# Patient Record
Sex: MALE | Race: WHITE | NOT HISPANIC OR LATINO | Employment: FULL TIME | URBAN - METROPOLITAN AREA
[De-identification: names, ages, dates, MRNs, and addresses within clinical notes are randomized per-mention and may not be internally consistent; named-entity substitution may affect disease eponyms.]

---

## 2017-04-04 ENCOUNTER — TELEPHONE (OUTPATIENT)
Dept: FAMILY MEDICINE | Facility: CLINIC | Age: 27
End: 2017-04-04

## 2017-04-04 DIAGNOSIS — F32.4 MAJOR DEPRESSIVE DISORDER WITH SINGLE EPISODE, IN PARTIAL REMISSION (H): ICD-10-CM

## 2017-04-04 NOTE — TELEPHONE ENCOUNTER
citalopram (CELEXA) 10 MG/5ML solution   20 mg, DAILY 1 ordered  Edit     Summary: Take 10 mLs (20 mg) by mouth daily, Disp-900 mL, R-1, E-Prescribe   Dose, Route, Frequency: 20 mg, Oral, DAILY  Start: 8/26/2016  Ord/Sold: 8/26/2016 (O)  Report  Taking:   Long-term:   Pharmacy: [id number] - [place], [address] AT [facility] & [address] [place] [address] Dose History       Patient Sig: Take 10 mLs (20 mg) by mouth daily       Ordered on: 8/26/2016       Authorized by: ANA WATTS       Dispense: 900 mL          Last Office Visit with [facility] primary care provider:  8-        Last PHQ-9 score on record=   PHQ-9 SCORE 8/26/2016   Total Score -   Total Score 4

## 2017-04-06 RX ORDER — CITALOPRAM HYDROBROMIDE 20 MG/10ML
SOLUTION, ORAL ORAL
Qty: 900 ML | Refills: 0 | Status: SHIPPED | OUTPATIENT
Start: 2017-04-06 | End: 2017-07-20

## 2017-04-06 NOTE — TELEPHONE ENCOUNTER
Prescription approved per [facility] Refill Protocol.     Lucila Wheeler RN

## 2017-04-06 NOTE — TELEPHONE ENCOUNTER
Patient is calling to check on status of refill.    Patient is completely out of medication.    Please call patient, [phone], and let them know when this has been sent to pharmacy.    Lotus Pierson

## 2017-07-20 ENCOUNTER — OFFICE VISIT (OUTPATIENT)
Dept: FAMILY MEDICINE | Facility: CLINIC | Age: 27
End: 2017-07-20
Payer: COMMERCIAL

## 2017-07-20 VITALS
SYSTOLIC BLOOD PRESSURE: 118 MMHG | TEMPERATURE: 98.5 F | DIASTOLIC BLOOD PRESSURE: 76 MMHG | BODY MASS INDEX: 25.54 KG/M2 | HEIGHT: 74 IN | WEIGHT: 199 LBS | HEART RATE: 80 BPM

## 2017-07-20 DIAGNOSIS — F32.4 MAJOR DEPRESSIVE DISORDER WITH SINGLE EPISODE, IN PARTIAL REMISSION (H): Primary | Chronic | ICD-10-CM

## 2017-07-20 DIAGNOSIS — R53.83 FATIGUE, UNSPECIFIED TYPE: ICD-10-CM

## 2017-07-20 PROCEDURE — 99213 OFFICE O/P EST LOW 20 MIN: CPT | Performed by: FAMILY MEDICINE

## 2017-07-20 RX ORDER — CITALOPRAM HYDROBROMIDE 20 MG/10ML
SOLUTION, ORAL ORAL
Qty: 900 ML | Refills: 1 | Status: SHIPPED | OUTPATIENT
Start: 2017-07-20 | End: 2018-03-13

## 2017-07-20 ASSESSMENT — ANXIETY QUESTIONNAIRES
5. BEING SO RESTLESS THAT IT IS HARD TO SIT STILL: MORE THAN HALF THE DAYS
2. NOT BEING ABLE TO STOP OR CONTROL WORRYING: NOT AT ALL
3. WORRYING TOO MUCH ABOUT DIFFERENT THINGS: NEARLY EVERY DAY
7. FEELING AFRAID AS IF SOMETHING AWFUL MIGHT HAPPEN: NOT AT ALL
1. FEELING NERVOUS, ANXIOUS, OR ON EDGE: NEARLY EVERY DAY
6. BECOMING EASILY ANNOYED OR IRRITABLE: NEARLY EVERY DAY
GAD7 TOTAL SCORE: 14

## 2017-07-20 ASSESSMENT — PATIENT HEALTH QUESTIONNAIRE - PHQ9: 5. POOR APPETITE OR OVEREATING: NEARLY EVERY DAY

## 2017-07-20 NOTE — PROGRESS NOTES
SUBJECTIVE:                                                    Familia Liz is a 26 year old male who presents to clinic today for the following health issues:    1 year ago I put him on celexa and referred him to be seen at the sleep clinic because he was always tired and was concerned about sleep apnea. The sleep clinic gave him Ambien and told him to go back for a sleep study, but he did not follow up.     Depression Followup    Status since last visit: Stable     See PHQ-9 for current symptoms.  Other associated symptoms: None    Complicating factors:   Significant life event:  No   Current substance abuse:  None  Anxiety or Panic symptoms:  Yes-  Gets anxious every once in a while    Patient has been taking celexa every day for the past year until about the last 3 weeks. He noticed his mood has been different the past couple weeks while being off of celexa, so he is here today for a refill.     PHQ-9  English  PHQ-9   Any Language      Amount of exercise or physical activity: 5 days per week    Problems taking medications regularly: Normally no but hasn't had the medication for 3 weeks because he needs refills.    Medication side effects: none  Diet: regular (no restrictions)      Fatigue During the Day:  Patient states that he falls asleep if he is not stimulated he will fall asleep. He states that sometimes when he is driving and watching TV he feels as if he will fall asleep. Patient also has a referral for a sleep study and would like to know if it is still valid so he can check with his insurance on coverage. He is switching insurance and it was too expensive on his old insurance which is why he decided not to go last year. Patient never filled the Ambien prescription because he hadn't had a sleep study yet.  Does not have the prescription anymore but would like to get another one once he has the sleep study set up.     Problem list and histories reviewed & adjusted, as indicated.  Additional  "history: as documented    Patient Active Problem List   Diagnosis     Depression     Eosinophilic esophagitis     Past Surgical History:   Procedure Laterality Date     NO HISTORY OF SURGERY         Social History   Substance Use Topics     Smoking status: Never Smoker     Smokeless tobacco: Never Used     Alcohol use Yes      Comment: 1-2 drinks a week     Family History   Problem Relation Age of Onset     Coronary Artery Disease No family hx of      CEREBROVASCULAR DISEASE No family hx of          BP Readings from Last 3 Encounters:   07/20/17 118/76   12/23/16 135/89   08/26/16 122/84    Wt Readings from Last 3 Encounters:   07/20/17 199 lb (90.3 kg)   12/23/16 190 lb (86.2 kg)   08/26/16 190 lb (86.2 kg)                        Reviewed and updated as needed this visit by clinical staffTobacco  Allergies  Med Hx  Surg Hx  Fam Hx  Soc Hx      Reviewed and updated as needed this visit by Provider         ROS:  Constitutional, HEENT, cardiovascular, pulmonary, GI, , musculoskeletal, neuro, skin, endocrine and psych systems are negative, except as otherwise noted.    This document serves as a record of the services and decisions personally performed by ANA WATTS. It was created on his/her behalf by Karrie Larson, a trained medical scribe. The creation of this document is based on the provider's statements to the medical scribe. Karrie Larson, July 20, 2017 2:04 PM    OBJECTIVE:   /76 (BP Location: Right arm, Cuff Size: Adult Regular)  Pulse 80  Temp 98.5  F (36.9  C) (Oral)  Ht 6' 2\" (1.88 m)  Wt 199 lb (90.3 kg)  BMI 25.55 kg/m2  Body mass index is 25.55 kg/(m^2).  GENERAL: healthy, alert and no distress  HENT: 3+ tonsils. ear canals and TM's normal, nose and mouth without ulcers or lesions  NECK: no adenopathy, no asymmetry, masses, or scars and thyroid normal to palpation  RESP: lungs clear to auscultation - no rales, rhonchi or wheezes  CV: regular rate and rhythm, normal S1 S2, no S3 or S4, no " murmur, click or rub, no peripheral edema and peripheral pulses strong  PSYCH: mentation appears normal, affect normal/bright    Diagnostic Test Results:  No results found for this or any previous visit (from the past 24 hour(s)).    ASSESSMENT/PLAN:     Depression; recurrent episode-- Mild   Associated with the following complications:    None   Plan:  No changes in the patient's current treatment plan          ICD-10-CM    1. Major depressive disorder with single episode, in partial remission (H) F32.4 citalopram (CELEXA) 10 MG/5ML solution   2. Fatigue, unspecified type R53.83 SLEEP EVALUATION & MANAGEMENT REFERRAL - ADULT     I refilled the patient's celexa and he will fill out the depression questionnaire in 1 month and drop it off at the clinic. I referred him to the sleep clinic. He will follow-up in 6 months for a med check.     Patient Instructions   Fill out the depression questionnaire in 1 month and drop it off at the clinic.    Schedule sleep study.    Follow-up in 6 months.       Dinorah Tillman,   [facility]    The information in this document, created by the medical scribe Karrie Larson for me, accurately reflects the services I personally performed and the decisions made by me. I have reviewed and approved this document for accuracy prior to leaving the patient care area.

## 2017-07-20 NOTE — MR AVS SNAPSHOT
After Visit Summary   7/20/2017    Familia Liz    MRN: [id number]           Patient Information     Date Of Birth          1990        Visit Information        Provider Department      7/20/2017 1:20 PM Dinorah Tillman DO [facility]        Today's Diagnoses     Major depressive disorder with single episode, in partial remission (H)    -  1    Fatigue, unspecified type          Care Instructions    Fill out the depression questionnaire in 1 month and drop it off at the clinic.    Schedule sleep study.    Follow-up in 6 months.           Follow-ups after your visit        Additional Services     SLEEP EVALUATION & MANAGEMENT REFERRAL - ADULT       Please be aware that coverage of these services is subject to the terms and limitations of your health insurance plan.  Call member services at your health plan with any benefit or coverage questions.      Please bring the following to your appointment:    >>   List of current medications   >>   This referral request   >>   Any documents/labs given to you for this referral    [facility] [phone] (Age 15 and up)                  Future tests that were ordered for you today     Open Future Orders        Priority Expected Expires Ordered    SLEEP EVALUATION & MANAGEMENT REFERRAL - ADULT Routine  7/20/2018 7/20/2017            Who to contact     If you have questions or need follow up information about today's clinic visit or your schedule please contact [facility] directly at [phone].  Normal or non-critical lab and imaging results will be communicated to you by MyChart, letter or phone within 4 business days after the clinic has received the results. If you do not hear from us within 7 days, please contact the clinic through MyChart or phone. If you have a critical or abnormal lab result, we will notify you by phone as soon as possible.  Submit refill requests through [employer]  "or call your pharmacy and they will forward the refill request to us. Please allow 3 business days for your refill to be completed.          Additional Information About Your Visit        MyChart Information     [employer] lets you send messages to your doctor, view your test results, renew your prescriptions, schedule appointments and more. To sign up, go to [email or web address] . Click on \"Log in\" on the left side of the screen, which will take you to the Welcome page. Then click on \"Sign up Now\" on the right side of the page.     You will be asked to enter the access code listed below, as well as some personal information. Please follow the directions to create your username and password.     Your access code is: [email or web address]  Expires: 10/18/2017  2:05 PM     Your access code will  in 90 days. If you need help or a new code, please call your [facility] clinic or [phone].        Care EveryWhere ID     This is your [facility] EveryWhere ID. This could be used by other organizations to access your [facility] medical records  [id number]        Your Vitals Were     Pulse Temperature Height BMI (Body Mass Index)          80 98.5  F (36.9  C) (Oral) 6' 2\" (1.88 m) 25.55 kg/m2         Blood Pressure from Last 3 Encounters:   17 118/76   16 135/89   16 122/84    Weight from Last 3 Encounters:   17 199 lb (90.3 kg)   16 190 lb (86.2 kg)   16 190 lb (86.2 kg)                 Today's Medication Changes          These changes are accurate as of: 17  2:05 PM.  If you have any questions, ask your nurse or doctor.               These medicines have changed or have updated prescriptions.        Dose/Directions    citalopram 10 MG/5ML solution   Commonly known as:  celeXA   This may have changed:  See the new instructions.   Used for:  Major depressive disorder with single episode, in partial remission (H)   Changed by:  Dinorah Tillman, DO        TAKE 10 ML BY MOUTH DAILY   Quantity:  900 " mL   Refills:  1            Where to get your medicines      These medications were sent to [id number] - [place], [place] - [address] AT [facility] [address]  [address], [place] [address]     Phone:  [phone]     citalopram 10 MG/5ML solution                Primary Care Provider Office Phone # Fax #    Dinorah Tillman [phone]       [facility] [address] [facility] [address]        Equal Access to Services     LING GREY : Hadii aad ku hadasho Soomaali, waaxda luqadaha, qaybta kaalmada adeegyada, waxay jaunin haymary pacheco . So [place] -[phone].    ATENCIÓN: Si habla español, tiene a steinberg disposición servicios gratuitos de asistencia lingüística. LlLake [facility] [phone].    We comply with applicable federal civil rights laws and [place] laws. We do not discriminate on the basis of race, color, national origin, age, disability sex, sexual orientation or gender identity.            Thank you!     Thank you for choosing [facility]  for your care. Our goal is always to provide you with excellent care. Hearing back from our patients is one way we can continue to improve our services. Please take a few minutes to complete the written survey that you may receive in the mail after your visit with us. Thank you!             Your Updated Medication List - Protect others around you: Learn how to safely use, store and throw away your medicines at www.disposemymeds.org.          This list is accurate as of: 7/20/17  2:05 PM.  Always use your most recent med list.                   Brand Name Dispense Instructions for use Diagnosis    citalopram 10 MG/5ML solution    celeXA    900 mL    TAKE 10 ML BY MOUTH DAILY    Major depressive disorder with single episode, in partial remission (H)

## 2017-07-20 NOTE — NURSING NOTE
"Chief Complaint   Patient presents with     Depression       Initial There were no vitals taken for this visit. Estimated body mass index is 24.39 kg/(m^2) as calculated from the following:    Height as of 12/23/16: 6' 2\" (1.88 m).    Weight as of 12/23/16: 190 lb (86.2 kg).  Medication Reconciliation: complete   Paulette Harrison CMA      "

## 2017-07-20 NOTE — PATIENT INSTRUCTIONS
Fill out the depression questionnaire in 1 month and drop it off at the clinic.    Schedule sleep study.    Follow-up in 6 months.

## 2017-07-21 ASSESSMENT — PATIENT HEALTH QUESTIONNAIRE - PHQ9: SUM OF ALL RESPONSES TO PHQ QUESTIONS 1-9: 19

## 2017-07-21 ASSESSMENT — ANXIETY QUESTIONNAIRES: GAD7 TOTAL SCORE: 14

## 2017-08-03 ENCOUNTER — TELEPHONE (OUTPATIENT)
Dept: SLEEP MEDICINE | Facility: CLINIC | Age: 27
End: 2017-08-03

## 2017-08-03 NOTE — TELEPHONE ENCOUNTER
Hello, this patient has received 3 calls to schedule a sleep consult. At this time, the patient has not called back to schedule. Thank you.

## 2017-11-30 DIAGNOSIS — F32.4 MAJOR DEPRESSIVE DISORDER WITH SINGLE EPISODE, IN PARTIAL REMISSION (H): ICD-10-CM

## 2017-11-30 NOTE — TELEPHONE ENCOUNTER
Medication Detail      Disp Refills Start End ELISA   citalopram (CELEXA) 10 MG/5ML solution 900 mL 1 7/20/2017  No   Sig: TAKE 10 ML BY MOUTH DAILY   Class: E-Prescribe   Order: [id number]   E-Prescribing Status: Receipt confirmed by pharmacy (7/20/2017  2:05 PM CDT)     LOV:7/20/2017

## 2017-12-05 RX ORDER — CITALOPRAM HYDROBROMIDE 20 MG/10ML
SOLUTION, ORAL ORAL
Qty: 900 ML | Refills: 1 | Status: SHIPPED | OUTPATIENT
Start: 2017-12-05 | End: 2020-06-01

## 2017-12-06 NOTE — TELEPHONE ENCOUNTER
Routing refill request to provider for review/approval because:  Last PHQ-9 was 19. Also, unsure how much to refill in liquid form. Patient is due for follow up in 1 month.    Demar Alatorre RN

## 2018-01-11 ENCOUNTER — TELEPHONE (OUTPATIENT)
Dept: FAMILY MEDICINE | Facility: CLINIC | Age: 28
End: 2018-01-11

## 2018-01-11 NOTE — TELEPHONE ENCOUNTER
Please repeat PHQ-9.  Index date: 7/20/17  Follow up start date:  12/20/17  Follow up end date:  2/20/18    Yohana Cortes MA

## 2018-01-11 NOTE — TELEPHONE ENCOUNTER
Left message that we will call back.  Postpone one week and try again  Tanisha Martin,Clinic Rn  [place]

## 2018-03-13 ENCOUNTER — TELEPHONE (OUTPATIENT)
Dept: FAMILY MEDICINE | Facility: CLINIC | Age: 28
End: 2018-03-13

## 2018-03-13 DIAGNOSIS — F32.4 MAJOR DEPRESSIVE DISORDER WITH SINGLE EPISODE, IN PARTIAL REMISSION (H): Chronic | ICD-10-CM

## 2018-03-13 RX ORDER — CITALOPRAM HYDROBROMIDE 20 MG/10ML
SOLUTION, ORAL ORAL
Qty: 300 ML | Refills: 0 | Status: SHIPPED | OUTPATIENT
Start: 2018-03-13 | End: 2018-09-10

## 2018-03-13 NOTE — TELEPHONE ENCOUNTER
Reason for Call:  Medication or medication refill:    Do you use a [facility]?  Name of the pharmacy and phone number for the current request:  [id number] - [place], [place] - [facility] AT [facility] [address]    Name of the medication requested: citalopram (CELEXA) 10 MG/5ML solution    Other request: Patient is going out of town this weekend and is hoping to have the prescription ready before Friday. He has made an appointment 03/26/2018 with Dr Tillman    Can we leave a detailed message on this number? YES    Phone number patient can be reached at: Home number on file [phone] (home)    Best Time: anytime    Call taken on 3/13/2018 at 1:25 PM by Haven Kurtz

## 2018-03-13 NOTE — TELEPHONE ENCOUNTER
Patient notified that med refilled via phone. He has f/u appt scheduled with PCP on 3/26/18.  Ruchi Kitchen RN

## 2018-09-10 ENCOUNTER — TELEPHONE (OUTPATIENT)
Dept: FAMILY MEDICINE | Facility: CLINIC | Age: 28
End: 2018-09-10

## 2018-09-10 DIAGNOSIS — F32.4 MAJOR DEPRESSIVE DISORDER WITH SINGLE EPISODE, IN PARTIAL REMISSION (H): Chronic | ICD-10-CM

## 2018-09-10 NOTE — LETTER
[facility]  [address] [place] [address]  Phone: [phone]        September 26, 2018      Familia Liz                                                                                                             [address] [place] [address]            Hakan Barbosa,        We have attempted to reach you regarding your refill request, but have been unsuccessful.    We have received a refill request for one of your medications. We have sent a refill of your medication to your pharmacy, however your provider has noted that you will need to be seen for a follow up visit in order to continue this medication.    Please contact us at [phone] to schedule an appointment with your provider before your next refill is due.      Thank you,      Your Health Care Team at the [facility]

## 2018-09-10 NOTE — TELEPHONE ENCOUNTER
"Requested Prescriptions   Pending Prescriptions Disp Refills     citalopram (CELEXA) 10 MG/5ML solution [Pharmacy Med Name: CITALOPRAM 10MG/5ML SOLUTION]  Last Written Prescription Date:  3/13/2018  Last Fill Quantity: 300 mL,  # refills: 0   Last office visit: 7/20/2017 with prescribing provider:  ERA Tillman   Future Office Visit:     900 mL 0     Sig: TAKE 10 MLS BY MOUTH DAILY    SSRIs Protocol Failed    9/10/2018  9:06 AM       Failed - PHQ-9 score less than 5 in past 6 months    Please review last PHQ-9 score.   PHQ-9 SCORE 10/1/2015 8/26/2016 7/20/2017   Total Score - - -   Total Score 1 4 19     LEILA-7 SCORE 6/26/2015 8/26/2016 7/20/2017   Total Score 11 - -   Total Score - 2 14          Failed - Recent (6 mo) or future (30 days) visit within the authorizing provider's specialty    Patient had office visit in the last 6 months or has a visit in the next 30 days with authorizing provider or within the authorizing provider's specialty.  See \"Patient Info\" tab in inbasket, or \"Choose Columns\" in Meds & Orders section of the refill encounter.           Passed - Patient is age 18 or older          "

## 2018-09-11 RX ORDER — CITALOPRAM HYDROBROMIDE 20 MG/10ML
SOLUTION, ORAL ORAL
Qty: 300 ML | Refills: 0 | Status: SHIPPED | OUTPATIENT
Start: 2018-09-11 | End: 2018-10-24

## 2018-09-11 NOTE — TELEPHONE ENCOUNTER
Route to PCP. Over a year since last OV and patient no showed last appt and has nothing scheduled. Would you like to give yue? If so, please send back to team for scheduling.    Demar Alatorre RN

## 2018-09-12 NOTE — TELEPHONE ENCOUNTER
Called patient to schedule CPE or med check. Phone number in chart is out of service. Will try again.    Thanks!  Demar Tinoco

## 2018-09-12 NOTE — TELEPHONE ENCOUNTER
Will flag for TC to please contact patient and schedule annual physical w/depression check.    Rafi Ching RN

## 2018-09-19 NOTE — TELEPHONE ENCOUNTER
Attempted to reach patient, number in chart is still out of service, will call back.    Thanks!  Demar Tinoco

## 2018-09-26 NOTE — TELEPHONE ENCOUNTER
Called patient and left a VM message to call and schedule a physical/ med check with PCP.    Lotus Pierson

## 2018-10-24 ENCOUNTER — TELEPHONE (OUTPATIENT)
Dept: FAMILY MEDICINE | Facility: CLINIC | Age: 28
End: 2018-10-24

## 2018-10-24 DIAGNOSIS — F32.4 MAJOR DEPRESSIVE DISORDER WITH SINGLE EPISODE, IN PARTIAL REMISSION (H): ICD-10-CM

## 2018-10-24 RX ORDER — CITALOPRAM HYDROBROMIDE 20 MG/10ML
SOLUTION, ORAL ORAL
Qty: 300 ML | Refills: 0 | Status: SHIPPED | OUTPATIENT
Start: 2018-10-24 | End: 2019-04-23

## 2018-10-24 NOTE — TELEPHONE ENCOUNTER
Reason for Call:  Medication or medication refill:    Do you use a [facility]?  Name of the pharmacy and phone number for the current request:  [facility] [address] [facility], [facility] [phone]    Name of the medication requested: citalopram (CELEXA) 10 MG/5ML solution    Other request:    Patient is scheduled to see Dr Tillman on 11/06/2018 for a refill on his meds but he is wanting to know if Dr Tillman will send a refill to get him through until his appointment.  Please call patient to let him know when this is sent to pharmacy.    Can we leave a detailed message on this number? YES    Phone number patient can be reached at: Home number on file [phone] (home)    Best Time:     Call taken on 10/24/2018 at 12:29 PM by Lotus Pierson

## 2018-10-24 NOTE — TELEPHONE ENCOUNTER
Patient has not been seen since 7/20/17. Appointment scheduled 11/6/18. Can patient have a small amount to hold him over until his appointment?    Routed to provider to advise.  Priscilla Mccoy RN  [facility]

## 2019-02-25 ENCOUNTER — TELEPHONE (OUTPATIENT)
Dept: FAMILY MEDICINE | Facility: CLINIC | Age: 29
End: 2019-02-25

## 2019-02-25 DIAGNOSIS — F32.4 MAJOR DEPRESSIVE DISORDER WITH SINGLE EPISODE, IN PARTIAL REMISSION (H): ICD-10-CM

## 2019-02-25 NOTE — TELEPHONE ENCOUNTER
"Requested Prescriptions   Pending Prescriptions Disp Refills     citalopram (CELEXA) 10 MG/5ML solution [Pharmacy Med Name: CITALOPRAM 10MG/5ML SOLUTION]  Last Written Prescription Date:  10/24/2018  Last Fill Quantity: 300 mL,  # refills: 0   Last office visit: 7/20/2017 with prescribing provider:  ERA Tillman   Future Office Visit:     300 mL 0     Sig: TAKE 10 ML BY MOUTH DAILY    SSRIs Protocol Failed - 2/25/2019  1:28 PM       Failed - PHQ-9 score less than 5 in past 6 months    Please review last PHQ-9 score.   PHQ-9 SCORE 10/1/2015 8/26/2016 7/20/2017   PHQ-9 Total Score - - -   PHQ-9 Total Score 1 4 19     LEILA-7 SCORE 6/26/2015 8/26/2016 7/20/2017   Total Score 11 - -   Total Score - 2 14          Failed - Recent (6 mo) or future (30 days) visit within the authorizing provider's specialty    Patient had office visit in the last 6 months or has a visit in the next 30 days with authorizing provider or within the authorizing provider's specialty.  See \"Patient Info\" tab in inbasket, or \"Choose Columns\" in Meds & Orders section of the refill encounter.           Passed - Medication is active on med list       Passed - Patient is age 18 or older          "

## 2019-02-26 RX ORDER — CITALOPRAM HYDROBROMIDE 20 MG/10ML
SOLUTION, ORAL ORAL
Qty: 300 ML | Refills: 0 | OUTPATIENT
Start: 2019-02-26

## 2019-02-26 NOTE — TELEPHONE ENCOUNTER
Patient has no showed last 2 office visits.  Last office visit: 7/20/2017.  TC:   Please outreach to patient and help scheduled appt.  Thank you.  Almaz Oden RN

## 2019-02-26 NOTE — TELEPHONE ENCOUNTER
Letters and calls have been sent to patient. Refused with note to pharmacy.    Demar Alatorre RN

## 2019-04-23 ENCOUNTER — OFFICE VISIT (OUTPATIENT)
Dept: FAMILY MEDICINE | Facility: CLINIC | Age: 29
End: 2019-04-23
Payer: COMMERCIAL

## 2019-04-23 VITALS
TEMPERATURE: 97.8 F | DIASTOLIC BLOOD PRESSURE: 76 MMHG | BODY MASS INDEX: 25.38 KG/M2 | HEIGHT: 74 IN | WEIGHT: 197.8 LBS | HEART RATE: 76 BPM | SYSTOLIC BLOOD PRESSURE: 108 MMHG

## 2019-04-23 DIAGNOSIS — F32.4 MAJOR DEPRESSIVE DISORDER WITH SINGLE EPISODE, IN PARTIAL REMISSION (H): Primary | ICD-10-CM

## 2019-04-23 DIAGNOSIS — L72.9 CYST OF SKIN: ICD-10-CM

## 2019-04-23 DIAGNOSIS — R53.82 CHRONIC FATIGUE: ICD-10-CM

## 2019-04-23 PROCEDURE — 99214 OFFICE O/P EST MOD 30 MIN: CPT | Performed by: FAMILY MEDICINE

## 2019-04-23 RX ORDER — CITALOPRAM HYDROBROMIDE 20 MG/10ML
SOLUTION, ORAL ORAL
Qty: 300 ML | Refills: 11 | Status: SHIPPED | OUTPATIENT
Start: 2019-04-23 | End: 2020-06-02

## 2019-04-23 ASSESSMENT — PATIENT HEALTH QUESTIONNAIRE - PHQ9
5. POOR APPETITE OR OVEREATING: NOT AT ALL
SUM OF ALL RESPONSES TO PHQ QUESTIONS 1-9: 20

## 2019-04-23 ASSESSMENT — ANXIETY QUESTIONNAIRES
7. FEELING AFRAID AS IF SOMETHING AWFUL MIGHT HAPPEN: SEVERAL DAYS
1. FEELING NERVOUS, ANXIOUS, OR ON EDGE: NEARLY EVERY DAY
3. WORRYING TOO MUCH ABOUT DIFFERENT THINGS: MORE THAN HALF THE DAYS
IF YOU CHECKED OFF ANY PROBLEMS ON THIS QUESTIONNAIRE, HOW DIFFICULT HAVE THESE PROBLEMS MADE IT FOR YOU TO DO YOUR WORK, TAKE CARE OF THINGS AT HOME, OR GET ALONG WITH OTHER PEOPLE: VERY DIFFICULT
6. BECOMING EASILY ANNOYED OR IRRITABLE: NEARLY EVERY DAY
2. NOT BEING ABLE TO STOP OR CONTROL WORRYING: SEVERAL DAYS
GAD7 TOTAL SCORE: 10
5. BEING SO RESTLESS THAT IT IS HARD TO SIT STILL: NOT AT ALL

## 2019-04-23 ASSESSMENT — PAIN SCALES - GENERAL: PAINLEVEL: MILD PAIN (2)

## 2019-04-23 ASSESSMENT — MIFFLIN-ST. JEOR: SCORE: 1941.96

## 2019-04-23 NOTE — PROGRESS NOTES
SUBJECTIVE:   Familia Liz is a 28 year old male who presents to clinic today for the following   health issues:    Depression Followup    Status since last visit: Worsened since patient has not been on medication    See PHQ-9 for current symptoms.  Other associated symptoms: less motivation    Complicating factors:   Significant life event:  No   Current substance abuse:  None  Anxiety or Panic symptoms:  No    He has not been seen since 7/2017.  He has been referred for sleep study in the past due to falling asleep driving if he goes more than two hours.  He didn't get the sleep study.  He is tired all the time.      PHQ 10/1/2015 8/26/2016 7/20/2017   PHQ-9 Total Score 1 4 19   Q9: Thoughts of better off dead/self-harm past 2 weeks Not at all Not at all Not at all     In the past two weeks have you had thoughts of suicide or self-harm?  No.    Do you have concerns about your personal safety or the safety of others?   No  PHQ-9  English  PHQ-9   Any Language  Suicide Assessment Five-step Evaluation and Treatment (SAFE-T)      Amount of exercise or physical activity: 4-5 days/week for an average of greater than 60 minutes    Problems taking medications regularly: No    Medication side effects: none    Diet: regular (no restrictions)    Patient says he has ear problems on the left side, its ongoing for 4 months. Patient says there is cyst behind his left ear. 2/10 pain.   Patient hears noises in the left ear.   His ear canal is sensitive to the tough.  There is blood and pus that comes out but this is less.        Additional history: as documented    Reviewed  and updated as needed this visit by clinical staff         Reviewed and updated as needed this visit by Provider         BP Readings from Last 3 Encounters:   04/23/19 108/76   07/20/17 118/76   12/23/16 135/89    Wt Readings from Last 3 Encounters:   04/23/19 89.7 kg (197 lb 12.8 oz)   07/20/17 90.3 kg (199 lb)   12/23/16 86.2 kg (190 lb)           "     ROS:  Constitutional, HEENT, cardiovascular, pulmonary, GI, , musculoskeletal, neuro, skin, endocrine and psych systems are negative, except as otherwise noted.    OBJECTIVE:     /76 (BP Location: Right arm, Patient Position: Chair, Cuff Size: Adult Large)   Pulse 76   Temp 97.8  F (36.6  C) (Oral)   Ht 1.88 m (6' 2\")   Wt 89.7 kg (197 lb 12.8 oz)   BMI 25.40 kg/m    Body mass index is 25.4 kg/m .  GENERAL: healthy, alert and no distress  EYES: Eyes grossly normal to inspection, PERRL and conjunctivae and sclerae normal  HENT: ear canals and TM's normal, nose and mouth without ulcers or lesions  SKIN: cyst behind left ear no discharge nontender   NECK: no adenopathy, no asymmetry, masses, or scars and thyroid normal to palpation  RESP: lungs clear to auscultation - no rales, rhonchi or wheezes  CV: regular rate and rhythm, normal S1 S2, no S3 or S4, no murmur, click or rub, no peripheral edema and peripheral pulses strong  PSYCH: mentation appears normal, affect normal/bright    Diagnostic Test Results:  none     ASSESSMENT/PLAN:     Depression; recurrent episode-- Moderate   Associated with the following complications:    None   Plan:  The following changes are made - restart medications.    Recheck by mail in 3-4months,  PHQ9 sent home with the patient         ICD-10-CM    1. Major depressive disorder with single episode, in partial remission (H) F32.4 citalopram (CELEXA) 10 MG/5ML solution   2. Cyst of skin L72.9 OTOLARYNGOLOGY REFERRAL   3. Chronic fatigue R53.82 SLEEP EVALUATION & MANAGEMENT REFERRAL - ADULT -[facility] [place]  [phone] (Age 15 and up)     He has a cyst capsule behind the left ear will likely need removed.  It is very close to the ear canal so will refer to ent instead of gen surgery.  He is still falling asleep if prolonged driving.  I suggested he limit his driving until he sees sleep.      Dinorah Tillman, DO  [facility]        "

## 2019-04-24 ASSESSMENT — ANXIETY QUESTIONNAIRES: GAD7 TOTAL SCORE: 10

## 2019-11-06 ENCOUNTER — TELEPHONE (OUTPATIENT)
Dept: FAMILY MEDICINE | Facility: CLINIC | Age: 29
End: 2019-11-06

## 2019-11-06 NOTE — TELEPHONE ENCOUNTER
Panel Management Review      Patient has the following on his problem list:     Depression / Dysthymia review    Measure:  Needs PHQ-9 score of 4 or less during index window.  Administer PHQ-9 and if score is 5 or more, send encounter to provider for next steps.    5 - 7 month window range: 8/24-12/22    PHQ-9 SCORE 8/26/2016 7/20/2017 4/23/2019   PHQ-9 Total Score - - -   PHQ-9 Total Score 4 19 20       If PHQ-9 recheck is 5 or more, route to provider for next steps.    Patient is due for:  PHQ9      Composite cancer screening  Chart review shows that this patient is due/due soon for the following None  Summary:    Patient is due/failing the following:   Flu shot, PHQ9 and PHYSICAL    Action needed:   Patient needs office visit for preventive care with flu shot. and Patient needs to do PHQ9.    Type of outreach:    Phone, left message for patient to call back.     Questions for provider review:    None                                                                                                                                    Angeles Herrera,        Chart routed to Care Team .

## 2019-11-06 NOTE — LETTER
[address] [id number] [phone]                                                                                                November 27, 2019    Familia Moore  [address]  [place] [address]        Dear Mr. Liz,    We have tried to contact you about your health, but have been unable to reach you.  Please call us as soon as possible so we can provide you with the best care possible.  We will continue to check in with you throughout the year to complete these items of care, if you are not able to complete these items at this time.  If you would like to complete the missing items for your care, please contact us at [phone].    We recommend the following:    - Depression  - Scheduling an Annual Physical / Wellness Visit with your primary care provider      Sincerely,     Your Care Team

## 2019-11-18 NOTE — TELEPHONE ENCOUNTER
Panel Management Review  Summary:    Type of outreach:    Phone, left message for patient to call back.     Encounter routed to Care Team.                                                                                                                               Angeles Herrera,

## 2019-11-27 NOTE — TELEPHONE ENCOUNTER
Panel Management Review  Summary:    Type of outreach:    Sent letter.    Encounter routed to No Action Needed.                                                                                                                                 Nathalie Ramos MA

## 2020-05-29 DIAGNOSIS — F32.4 MAJOR DEPRESSIVE DISORDER WITH SINGLE EPISODE, IN PARTIAL REMISSION (H): ICD-10-CM

## 2020-05-29 NOTE — LETTER
June 9, 2020      Familia Liz  [address] [place] [address]      Dear Familia,    We recently received a request for a refill of your medication.    A review of your chart indicates that an appointment is required with your provider.  Please call the clinic to schedule your appointment.      Thank you,      Dinorah Tillman DO

## 2020-06-01 NOTE — TELEPHONE ENCOUNTER
Last seen 4/23/19.  Due for annual visit.      Routed to provider to advise on refill (elevated PHQ9 so RN unable to fill) and follow up  (in clinic, virtual?).    Irlanda Simon RN  [facility]

## 2020-06-01 NOTE — TELEPHONE ENCOUNTER
"Requested Prescriptions   Pending Prescriptions Disp Refills     citalopram (CELEXA) 10 MG/5ML solution [Pharmacy Med Name: CITALOPRAM HBR 10 MG/5 ML SOLN] 240 mL 11     Sig: TAKE 10 MLS BY MOUTH DAILY       SSRIs Protocol Failed - 5/29/2020 12:17 PM        Failed - PHQ-9 score less than 5 in past 6 months     Please review last PHQ-9 score.     PHQ-9 score:    PHQ 4/23/2019   PHQ-9 Total Score 20   Q9: Thoughts of better off dead/self-harm past 2 weeks Not at all                       Failed - Recent (6 mo) or future (30 days) visit within the authorizing provider's specialty     Patient had office visit in the last 6 months or has a visit in the next 30 days with authorizing provider or within the authorizing provider's specialty.  See \"Patient Info\" tab in inbasket, or \"Choose Columns\" in Meds & Orders section of the refill encounter.            Passed - Medication is active on med list        Passed - Patient is age 18 or older             "

## 2020-06-02 RX ORDER — CITALOPRAM HYDROBROMIDE 20 MG/10ML
SOLUTION, ORAL ORAL
Qty: 240 ML | Refills: 0 | Status: SHIPPED | OUTPATIENT
Start: 2020-06-02 | End: 2020-09-10

## 2020-06-08 NOTE — TELEPHONE ENCOUNTER
Called patient and left a VM to schedule a virtual appointment with DR Tillman.    Lotus Pierosn

## 2020-06-09 NOTE — TELEPHONE ENCOUNTER
Called patient and left a VM to schedule a virtual appointment with Dr Tillman.    Lotus Pierson

## 2020-06-23 ENCOUNTER — TELEPHONE (OUTPATIENT)
Dept: FAMILY MEDICINE | Facility: CLINIC | Age: 30
End: 2020-06-23

## 2020-06-23 DIAGNOSIS — F32.4 MAJOR DEPRESSIVE DISORDER WITH SINGLE EPISODE, IN PARTIAL REMISSION (H): ICD-10-CM

## 2020-06-30 NOTE — TELEPHONE ENCOUNTER
Called patient and left a VM message to schedule a virtual appointment for medication check.    Lotus Pierson

## 2020-07-01 RX ORDER — CITALOPRAM HYDROBROMIDE 20 MG/10ML
SOLUTION, ORAL ORAL
Qty: 240 ML | Refills: 0 | OUTPATIENT
Start: 2020-07-01

## 2020-07-01 NOTE — TELEPHONE ENCOUNTER
Medication refused with note pt needs appointment and has one for refills.     Tamera Shelton RN

## 2020-07-01 NOTE — TELEPHONE ENCOUNTER
Routing to RN. Please un pend order and close encounter. Patient is scheduled tomorrow with Dr Tillman.    Lotus Pierson

## 2020-09-10 ENCOUNTER — VIRTUAL VISIT (OUTPATIENT)
Dept: FAMILY MEDICINE | Facility: CLINIC | Age: 30
End: 2020-09-10
Payer: COMMERCIAL

## 2020-09-10 DIAGNOSIS — F32.4 MAJOR DEPRESSIVE DISORDER WITH SINGLE EPISODE, IN PARTIAL REMISSION (H): ICD-10-CM

## 2020-09-10 PROCEDURE — 96127 BRIEF EMOTIONAL/BEHAV ASSMT: CPT | Performed by: FAMILY MEDICINE

## 2020-09-10 PROCEDURE — 99213 OFFICE O/P EST LOW 20 MIN: CPT | Mod: 95 | Performed by: FAMILY MEDICINE

## 2020-09-10 RX ORDER — CITALOPRAM HYDROBROMIDE 20 MG/10ML
20 SOLUTION, ORAL ORAL DAILY
Qty: 300 ML | Refills: 11 | Status: SHIPPED | OUTPATIENT
Start: 2020-09-10 | End: 2021-10-06

## 2020-09-10 ASSESSMENT — ANXIETY QUESTIONNAIRES
2. NOT BEING ABLE TO STOP OR CONTROL WORRYING: NOT AT ALL
5. BEING SO RESTLESS THAT IT IS HARD TO SIT STILL: SEVERAL DAYS
3. WORRYING TOO MUCH ABOUT DIFFERENT THINGS: NOT AT ALL
6. BECOMING EASILY ANNOYED OR IRRITABLE: NEARLY EVERY DAY
7. FEELING AFRAID AS IF SOMETHING AWFUL MIGHT HAPPEN: SEVERAL DAYS
1. FEELING NERVOUS, ANXIOUS, OR ON EDGE: SEVERAL DAYS
IF YOU CHECKED OFF ANY PROBLEMS ON THIS QUESTIONNAIRE, HOW DIFFICULT HAVE THESE PROBLEMS MADE IT FOR YOU TO DO YOUR WORK, TAKE CARE OF THINGS AT HOME, OR GET ALONG WITH OTHER PEOPLE: SOMEWHAT DIFFICULT
GAD7 TOTAL SCORE: 6

## 2020-09-10 ASSESSMENT — PATIENT HEALTH QUESTIONNAIRE - PHQ9
5. POOR APPETITE OR OVEREATING: NOT AT ALL
SUM OF ALL RESPONSES TO PHQ QUESTIONS 1-9: 16

## 2020-09-10 NOTE — PROGRESS NOTES
"Familia Liz is a 30 year old male who is being evaluated via a billable telephone visit.      The patient has been notified of following:     \"This telephone visit will be conducted via a call between you and your physician/provider. We have found that certain health care needs can be provided without the need for a physical exam.  This service lets us provide the care you need with a short phone conversation.  If a prescription is necessary we can send it directly to your pharmacy.  If lab work is needed we can place an order for that and you can then stop by our lab to have the test done at a later time.    Telephone visits are billed at different rates depending on your insurance coverage. During this emergency period, for some insurers they may be billed the same as an in-person visit.  Please reach out to your insurance provider with any questions.    If during the course of the call the physician/provider feels a telephone visit is not appropriate, you will not be charged for this service.\"    Patient has given verbal consent for Telephone visit?  Yes    What phone number would you like to be contacted at? Dial 1 then [phone]    How would you like to obtain your AVS? Mail a copy     ============================================================    Subjective     Familia Liz is a 30 year old male who presents via phone visit today for the following health issues:    HPI    Depression Followup    How are you doing with your depression since your last visit? Varies- depending on medication- ran out of refills- has been off of it x 3 months    Are you having other symptoms that might be associated with depression? Yes:  lack of concentration, no motivation, anxiety    Have you had a significant life event?  No     Are you feeling anxious or having panic attacks?   No- sometimes anxious    Do you have any concerns with your use of alcohol or other drugs? No     He has been on Celexa 20 mg daily.  " He takes this in liquid form due to his eosinophilic esophagitis.    Social History     Tobacco Use     Smoking status: Never Smoker     Smokeless tobacco: Never Used   Substance Use Topics     Alcohol use: Yes     Comment: 1-2 drinks a week     Drug use: No     PHQ 7/20/2017 4/23/2019 9/10/2020   PHQ-9 Total Score 19 20 16   Q9: Thoughts of better off dead/self-harm past 2 weeks Not at all Not at all Not at all     LEILA-7 SCORE 7/20/2017 4/23/2019 9/10/2020   Total Score - - -   Total Score 14 10 6     Last PHQ-9 9/10/2020   1.  Little interest or pleasure in doing things 3   2.  Feeling down, depressed, or hopeless 2   3.  Trouble falling or staying asleep, or sleeping too much 3   4.  Feeling tired or having little energy 3   5.  Poor appetite or overeating 2   6.  Feeling bad about yourself 0   7.  Trouble concentrating 3   8.  Moving slowly or restless 0   Q9: Thoughts of better off dead/self-harm past 2 weeks 0   PHQ-9 Total Score 16   Difficulty at work, home, or with people Somewhat difficult     LEILA-7  9/10/2020   1. Feeling nervous, anxious, or on edge 1   2. Not being able to stop or control worrying 0   3. Worrying too much about different things 0   4. Trouble relaxing 0   5. Being so restless that it is hard to sit still 1   6. Becoming easily annoyed or irritable 3   7. Feeling afraid, as if something awful might happen 1   LEILA-7 Total Score 6   If you checked any problems, how difficult have they made it for you to do your work, take care of things at home, or get along with other people? Somewhat difficult     In the past two weeks have you had thoughts of suicide or self-harm?  No.    Do you have concerns about your personal safety or the safety of others?   No    Suicide Assessment Five-step Evaluation and Treatment (SAFE-T)        Review of Systems   Constitutional, HEENT, cardiovascular, pulmonary, gi and gu systems are negative, except as otherwise noted.       Objective          Vitals:  No  vitals were obtained today due to virtual visit.    healthy, alert and no distress  PSYCH: Alert and oriented times 3; coherent speech, normal   rate and volume, able to articulate logical thoughts, able   to abstract reason, no tangential thoughts, no hallucinations   or delusions  His affect is normal  RESP: No cough, no audible wheezing, able to talk in full sentences  Remainder of exam unable to be completed due to telephone visits    No results found for this or any previous visit (from the past 24 hour(s)).        Assessment/Plan:    Assessment & Plan       ICD-10-CM    1. Major depressive disorder with single episode, in partial remission (H)  F32.4 citalopram (CELEXA) 10 MG/5ML solution     Refill for 1 year given to this patient.  He was urged to follow-up before he runs out next year.    We discussed his eosinophilic esophagitis which he feels is stable.  He does not have a GI doctor at this time    I urged the patient to get his flu shot this year.  I let him it could be catastrophic to to get COVID and influenza together.       Regular exercise    Return in about 1 year (around 9/10/2021) for mood recheck.    Dinorah Tillman DO  [facility]    Phone call duration: 6 minutes

## 2020-09-11 ASSESSMENT — ANXIETY QUESTIONNAIRES: GAD7 TOTAL SCORE: 6

## 2021-09-03 ENCOUNTER — OFFICE VISIT (OUTPATIENT)
Dept: PHYSICAL MEDICINE AND REHAB | Facility: CLINIC | Age: 31
End: 2021-09-03
Payer: COMMERCIAL

## 2021-09-03 VITALS
OXYGEN SATURATION: 99 % | BODY MASS INDEX: 25.03 KG/M2 | WEIGHT: 195 LBS | SYSTOLIC BLOOD PRESSURE: 148 MMHG | HEART RATE: 79 BPM | DIASTOLIC BLOOD PRESSURE: 96 MMHG | HEIGHT: 74 IN

## 2021-09-03 DIAGNOSIS — G44.319 ACUTE POST-TRAUMATIC HEADACHE, NOT INTRACTABLE: ICD-10-CM

## 2021-09-03 DIAGNOSIS — S16.1XXA CERVICAL MYOFASCIAL STRAIN, INITIAL ENCOUNTER: ICD-10-CM

## 2021-09-03 DIAGNOSIS — S06.0X0A CONCUSSION WITHOUT LOSS OF CONSCIOUSNESS, INITIAL ENCOUNTER: Primary | ICD-10-CM

## 2021-09-03 PROCEDURE — 99204 OFFICE O/P NEW MOD 45 MIN: CPT | Performed by: PHYSICAL MEDICINE & REHABILITATION

## 2021-09-03 ASSESSMENT — ANXIETY QUESTIONNAIRES
6. BECOMING EASILY ANNOYED OR IRRITABLE: NOT AT ALL
7. FEELING AFRAID AS IF SOMETHING AWFUL MIGHT HAPPEN: NOT AT ALL
3. WORRYING TOO MUCH ABOUT DIFFERENT THINGS: NOT AT ALL
2. NOT BEING ABLE TO STOP OR CONTROL WORRYING: SEVERAL DAYS
5. BEING SO RESTLESS THAT IT IS HARD TO SIT STILL: NOT AT ALL
GAD7 TOTAL SCORE: 2
1. FEELING NERVOUS, ANXIOUS, OR ON EDGE: SEVERAL DAYS

## 2021-09-03 ASSESSMENT — MIFFLIN-ST. JEOR: SCORE: 1914.26

## 2021-09-03 ASSESSMENT — PAIN SCALES - GENERAL: PAINLEVEL: MILD PAIN (2)

## 2021-09-03 ASSESSMENT — PATIENT HEALTH QUESTIONNAIRE - PHQ9: 5. POOR APPETITE OR OVEREATING: NOT AT ALL
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
## 2021-09-04 ASSESSMENT — ANXIETY QUESTIONNAIRES: GAD7 TOTAL SCORE: 2

## 2021-10-05 DIAGNOSIS — F32.4 MAJOR DEPRESSIVE DISORDER WITH SINGLE EPISODE, IN PARTIAL REMISSION (H): ICD-10-CM

## 2021-10-06 RX ORDER — CITALOPRAM HYDROBROMIDE 20 MG/10ML
SOLUTION, ORAL ORAL
Qty: 300 ML | Refills: 0 | Status: SHIPPED | OUTPATIENT
Start: 2021-10-06 | End: 2021-11-08

## 2021-10-06 NOTE — TELEPHONE ENCOUNTER
Routing refill request to provider for review/approval because:  Patient needs to be seen because it has been more than 1 year since last office visit.  Has not yet received a yue refill.  PHQ-9 score:    PHQ 9/10/2020   PHQ-9 Total Score 16   Q9: Thoughts of better off dead/self-harm past 2 weeks Not at all               Karrie Loya, RN, BSN, PHN  [facility]: [place]

## 2021-10-07 NOTE — TELEPHONE ENCOUNTER
Patient contacted and scheduled.    Thank you,  Nemo MCARTHUR  [facility]  Team [place]heline Coordinator

## 2021-11-08 DIAGNOSIS — F32.4 MAJOR DEPRESSIVE DISORDER WITH SINGLE EPISODE, IN PARTIAL REMISSION (H): ICD-10-CM

## 2021-11-08 RX ORDER — CITALOPRAM HYDROBROMIDE 20 MG/10ML
SOLUTION, ORAL ORAL
Qty: 240 ML | Refills: 0 | Status: SHIPPED | OUTPATIENT
Start: 2021-11-08

## 2021-11-08 NOTE — TELEPHONE ENCOUNTER
Routing refill request to provider for review/approval because:  Patient needs to be seen because:  6 month follow up  PHQ-9    Pending Prescriptions:                       Disp   Refills    citalopram (CELEXA) 10 MG/5ML solution [Ph*300 mL 0        Sig: TAKE 10 ML BY MOUTH DAILY        Babita Montero RN

## 2023-01-31 ENCOUNTER — OFFICE VISIT (OUTPATIENT)
Dept: FAMILY MEDICINE | Facility: CLINIC | Age: 33
End: 2023-01-31
Payer: COMMERCIAL

## 2023-01-31 VITALS
BODY MASS INDEX: 28.52 KG/M2 | OXYGEN SATURATION: 99 % | TEMPERATURE: 97.3 F | HEART RATE: 80 BPM | DIASTOLIC BLOOD PRESSURE: 84 MMHG | HEIGHT: 73 IN | RESPIRATION RATE: 20 BRPM | SYSTOLIC BLOOD PRESSURE: 120 MMHG | WEIGHT: 215.2 LBS

## 2023-01-31 DIAGNOSIS — F32.4 MAJOR DEPRESSIVE DISORDER WITH SINGLE EPISODE, IN PARTIAL REMISSION (H): Primary | ICD-10-CM

## 2023-01-31 DIAGNOSIS — G47.00 INSOMNIA, UNSPECIFIED TYPE: ICD-10-CM

## 2023-01-31 PROCEDURE — 99213 OFFICE O/P EST LOW 20 MIN: CPT | Performed by: FAMILY MEDICINE

## 2023-01-31 RX ORDER — CITALOPRAM HYDROBROMIDE 20 MG/10ML
SOLUTION, ORAL ORAL
Qty: 240 ML | Refills: 0 | Status: CANCELLED | OUTPATIENT
Start: 2023-01-31

## 2023-01-31 ASSESSMENT — PATIENT HEALTH QUESTIONNAIRE - PHQ9
10. IF YOU CHECKED OFF ANY PROBLEMS, HOW DIFFICULT HAVE THESE PROBLEMS MADE IT FOR YOU TO DO YOUR WORK, TAKE CARE OF THINGS AT HOME, OR GET ALONG WITH OTHER PEOPLE: EXTREMELY DIFFICULT
SUM OF ALL RESPONSES TO PHQ QUESTIONS 1-9: 18
SUM OF ALL RESPONSES TO PHQ QUESTIONS 1-9: 18

## 2023-01-31 ASSESSMENT — ANXIETY QUESTIONNAIRES
2. NOT BEING ABLE TO STOP OR CONTROL WORRYING: SEVERAL DAYS
GAD7 TOTAL SCORE: 11
3. WORRYING TOO MUCH ABOUT DIFFERENT THINGS: SEVERAL DAYS
4. TROUBLE RELAXING: MORE THAN HALF THE DAYS
IF YOU CHECKED OFF ANY PROBLEMS ON THIS QUESTIONNAIRE, HOW DIFFICULT HAVE THESE PROBLEMS MADE IT FOR YOU TO DO YOUR WORK, TAKE CARE OF THINGS AT HOME, OR GET ALONG WITH OTHER PEOPLE: VERY DIFFICULT
8. IF YOU CHECKED OFF ANY PROBLEMS, HOW DIFFICULT HAVE THESE MADE IT FOR YOU TO DO YOUR WORK, TAKE CARE OF THINGS AT HOME, OR GET ALONG WITH OTHER PEOPLE?: VERY DIFFICULT
1. FEELING NERVOUS, ANXIOUS, OR ON EDGE: NEARLY EVERY DAY
GAD7 TOTAL SCORE: 11
GAD7 TOTAL SCORE: 11
6. BECOMING EASILY ANNOYED OR IRRITABLE: NEARLY EVERY DAY
7. FEELING AFRAID AS IF SOMETHING AWFUL MIGHT HAPPEN: NOT AT ALL
7. FEELING AFRAID AS IF SOMETHING AWFUL MIGHT HAPPEN: NOT AT ALL
5. BEING SO RESTLESS THAT IT IS HARD TO SIT STILL: SEVERAL DAYS

## 2023-01-31 NOTE — PATIENT INSTRUCTIONS
Check with your insurance regarding coverage of the other sleep clinic.    If you are able to get into a good sleep pattern, and you are still not feeling your mood is where you want it to be, then please schedule a virtual visit with me.

## 2023-01-31 NOTE — PROGRESS NOTES
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  Still with low mood  Plan: discussed options of therapy, medications, or both.  Also discussed the importance of good sleep on mood.  Will consider options, prefers to start by working on sleep.  If not improving once he has better sleep, then would consider going back on celexa (pill form, no longer needing liquid form)    (G47.00) Insomnia, unspecified type  Comment: does not feel rested, despite CPAP machine  Plan: Adult Sleep Eval & Management          Referral        Referral provided.           BMI:   Estimated body mass index is 28.68 kg/m  as calculated from the following:    Height as of this encounter: 1.845 m (6' 0.64\").    Weight as of this encounter: 97.6 kg (215 lb 3.2 oz).       Depression Screening Follow Up    PHQ 1/31/2023   PHQ-9 Total Score 18   Q9: Thoughts of better off dead/self-harm past 2 weeks Not at all         Return in about 3 months (around 4/30/2023) for Preventive Health visit (physical).    Adriana Moss MD  [facility]    Trace Montiel is a 32 year old, presenting for the following health issues:  Depression and Anxiety    Stopped taking medication about one year ago.  Wanted to try to be off medication  Stopped drinking alcohol, kept exercising regularly.  Feels better but not where he believes his mood should be/used to be.    Irritable, down, hopeless.  Nothing feels fun or exciting.    Sleep -   Was given a referral to sleep specialist  Has difficulty sleeping.  Did gt a CPAP machine.  But finds it hard to use.    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  But that job ended about a year ago.  Now tries to go to bed 10:30-11:30 and gets up about 4-5 am.  Wakes up feeling tired, even if he sleeps longer.    Goal for his mood - to feel more energized around " "people    History of Present Illness       Mental Health Follow-up:  Patient presents to follow-up on Depression & Anxiety.Patient's depression since last visit has been:  Worse  The patient is not having other symptoms associated with depression.  Patient's anxiety since last visit has been:  Worse  The patient is not having other symptoms associated with anxiety.  Any significant life events: No  Patient is feeling anxious or having panic attacks.  Patient has no concerns about alcohol or drug use.    Reason for visit:  Depression & general check up    He eats 2-3 servings of fruits and vegetables daily.He consumes 0 sweetened beverage(s) daily.He exercises with enough effort to increase his heart rate 60 or more minutes per day.  He exercises with enough effort to increase his heart rate 5 days per week. He is missing 5 dose(s) of medications per week.    Today's PHQ-9         PHQ-9 Total Score: 18    PHQ-9 Q9 Thoughts of better off dead/self-harm past 2 weeks :   Not at all    How difficult have these problems made it for you to do your work, take care of things at home, or get along with other people: Extremely difficult  Today's LEILA-7 Score: 11       Review of Systems   Constitutional, HEENT, cardiovascular, pulmonary, gi and gu systems are negative, except as otherwise noted.      Objective    /84 (BP Location: Right arm, Patient Position: Chair, Cuff Size: Adult Regular)   Pulse 80   Temp 97.3  F (36.3  C) (Tympanic)   Resp 20   Ht 1.845 m (6' 0.64\")   Wt 97.6 kg (215 lb 3.2 oz)   SpO2 99%   BMI 28.68 kg/m    Body mass index is 28.68 kg/m .  Physical Exam   GENERAL: healthy, alert and no distress  PSYCH: mentation appears normal, affect normal/bright                    "

## 2023-04-30 ENCOUNTER — HEALTH MAINTENANCE LETTER (OUTPATIENT)
Age: 33
End: 2023-04-30

## 2024-07-07 ENCOUNTER — HEALTH MAINTENANCE LETTER (OUTPATIENT)
Age: 34
End: 2024-07-07

## 2024-10-15 ENCOUNTER — PATIENT OUTREACH (OUTPATIENT)
Dept: FAMILY MEDICINE | Facility: CLINIC | Age: 34
End: 2024-10-15
Payer: COMMERCIAL

## 2024-10-15 NOTE — TELEPHONE ENCOUNTER
Patient Quality Outreach    Patient is due for the following:   Depression  -  PHQ-9 needed  Physical Preventive Adult Physical      Topic Date Due    HPV Vaccine (2 - Male 3-dose series) 09/23/2016    Flu Vaccine (1) 09/01/2024    COVID-19 Vaccine (3 - 2024-25 season) 09/01/2024       Next Steps:   Schedule a Adult Preventative    Type of outreach:    Sent [employer] message.      Questions for provider review:    None           Octavio Ramos MA

## 2025-07-19 ENCOUNTER — HEALTH MAINTENANCE LETTER (OUTPATIENT)
Age: 35
End: 2025-07-19